# Patient Record
Sex: MALE | Race: OTHER | NOT HISPANIC OR LATINO | URBAN - METROPOLITAN AREA
[De-identification: names, ages, dates, MRNs, and addresses within clinical notes are randomized per-mention and may not be internally consistent; named-entity substitution may affect disease eponyms.]

---

## 2017-11-24 ENCOUNTER — EMERGENCY (EMERGENCY)
Facility: HOSPITAL | Age: 32
LOS: 1 days | Discharge: HOME | End: 2017-11-24
Attending: EMERGENCY MEDICINE | Admitting: EMERGENCY MEDICINE
Payer: OTHER MISCELLANEOUS

## 2017-11-24 VITALS
OXYGEN SATURATION: 95 % | RESPIRATION RATE: 17 BRPM | HEART RATE: 67 BPM | TEMPERATURE: 98 F | SYSTOLIC BLOOD PRESSURE: 130 MMHG | WEIGHT: 300.05 LBS | DIASTOLIC BLOOD PRESSURE: 82 MMHG

## 2017-11-24 VITALS
HEART RATE: 86 BPM | SYSTOLIC BLOOD PRESSURE: 132 MMHG | TEMPERATURE: 98 F | OXYGEN SATURATION: 99 % | DIASTOLIC BLOOD PRESSURE: 78 MMHG | RESPIRATION RATE: 18 BRPM

## 2017-11-24 DIAGNOSIS — L50.0 ALLERGIC URTICARIA: ICD-10-CM

## 2017-11-24 PROCEDURE — 99283 EMERGENCY DEPT VISIT LOW MDM: CPT

## 2017-11-24 RX ORDER — CETIRIZINE HYDROCHLORIDE 10 MG/1
1 TABLET ORAL
Qty: 5 | Refills: 0 | OUTPATIENT
Start: 2017-11-24 | End: 2017-11-29

## 2017-11-24 RX ORDER — FAMOTIDINE 10 MG/ML
1 INJECTION INTRAVENOUS
Qty: 10 | Refills: 0 | OUTPATIENT
Start: 2017-11-24

## 2017-11-24 RX ORDER — FAMOTIDINE 10 MG/ML
20 INJECTION INTRAVENOUS ONCE
Qty: 0 | Refills: 0 | Status: COMPLETED | OUTPATIENT
Start: 2017-11-24 | End: 2017-11-24

## 2017-11-24 RX ADMIN — FAMOTIDINE 20 MILLIGRAM(S): 10 INJECTION INTRAVENOUS at 19:15

## 2017-11-24 RX ADMIN — Medication 40 MILLIGRAM(S): at 19:15

## 2017-11-24 NOTE — ED PROVIDER NOTE - OBJECTIVE STATEMENT
31 y/o Male presents to the ED for hives on the face. Pt states today he went to work and for lunch he had leftovers from [employer]. Around 330pm today he made an arrest in a group of protesters and denies any powder use. Pt is a [employer] officer. Took Benadryl and sx's resolved. Denies wheezing and throat closure.

## 2017-11-24 NOTE — ED ADULT TRIAGE NOTE - CHIEF COMPLAINT QUOTE
hives to right orbital area and back of neck, suddenly 30 minutes ago. benadryl taken prior to arrival. denies throat swelling/SOB

## 2024-08-11 NOTE — ED ADULT NURSE NOTE - PRO INTERPRETER NEED 2
Pt seen  by PCD on 8/8 4 plex negative.  Pt c/o congestion.  Pt today while basketball tri-outs and felt heaviness to his chest.  Pt PWD.  Talkative.  No distress.  Mom states decrease PO.     English